# Patient Record
Sex: MALE | Race: WHITE | NOT HISPANIC OR LATINO | Employment: FULL TIME | URBAN - METROPOLITAN AREA
[De-identification: names, ages, dates, MRNs, and addresses within clinical notes are randomized per-mention and may not be internally consistent; named-entity substitution may affect disease eponyms.]

---

## 2019-02-16 ENCOUNTER — OFFICE VISIT (OUTPATIENT)
Dept: URGENT CARE | Facility: CLINIC | Age: 39
End: 2019-02-16
Payer: COMMERCIAL

## 2019-02-16 VITALS
TEMPERATURE: 102 F | WEIGHT: 245 LBS | DIASTOLIC BLOOD PRESSURE: 65 MMHG | HEART RATE: 107 BPM | OXYGEN SATURATION: 98 % | HEIGHT: 66 IN | RESPIRATION RATE: 18 BRPM | SYSTOLIC BLOOD PRESSURE: 115 MMHG | BODY MASS INDEX: 39.37 KG/M2

## 2019-02-16 DIAGNOSIS — R50.9 FEVER, UNSPECIFIED FEVER CAUSE: ICD-10-CM

## 2019-02-16 DIAGNOSIS — J10.1 INFLUENZA A: Primary | ICD-10-CM

## 2019-02-16 LAB
CTP QC/QA: YES
FLUAV AG NPH QL: POSITIVE
FLUBV AG NPH QL: NEGATIVE

## 2019-02-16 PROCEDURE — 87804 INFLUENZA ASSAY W/OPTIC: CPT | Mod: QW,S$GLB,, | Performed by: NURSE PRACTITIONER

## 2019-02-16 PROCEDURE — 87804 POCT INFLUENZA A/B: ICD-10-PCS | Mod: 59,QW,S$GLB, | Performed by: NURSE PRACTITIONER

## 2019-02-16 PROCEDURE — 99203 OFFICE O/P NEW LOW 30 MIN: CPT | Mod: S$GLB,,, | Performed by: NURSE PRACTITIONER

## 2019-02-16 PROCEDURE — 99203 PR OFFICE/OUTPT VISIT, NEW, LEVL III, 30-44 MIN: ICD-10-PCS | Mod: S$GLB,,, | Performed by: NURSE PRACTITIONER

## 2019-02-16 RX ORDER — OSELTAMIVIR PHOSPHATE 75 MG/1
75 CAPSULE ORAL 2 TIMES DAILY
Qty: 10 CAPSULE | Refills: 0 | Status: SHIPPED | OUTPATIENT
Start: 2019-02-16 | End: 2019-02-21

## 2019-02-16 NOTE — PROGRESS NOTES
"Subjective:       Patient ID: Aldo Valencia is a 38 y.o. male.    Vitals:  height is 5' 6" (1.676 m) and weight is 111.1 kg (245 lb). His temperature is 102.4 °F (39.1 °C) (abnormal). His blood pressure is 115/65 and his pulse is 107. His respiration is 18 and oxygen saturation is 98%.     Chief Complaint: URI    URI    This is a new problem. The current episode started today. The problem has been unchanged. The maximum temperature recorded prior to his arrival was 102 - 102.9 F. Associated symptoms include congestion, coughing, headaches, rhinorrhea, sinus pain and a sore throat. Pertinent negatives include no ear pain, nausea, rash, vomiting or wheezing. He has tried antihistamine for the symptoms. The treatment provided no relief.       Constitution: Positive for chills, sweating, fatigue and fever.   HENT: Positive for congestion, postnasal drip, sinus pain, sinus pressure and sore throat. Negative for ear pain and voice change.    Neck: Negative for painful lymph nodes.   Eyes: Negative for eye redness.   Respiratory: Positive for cough and sputum production. Negative for chest tightness, bloody sputum, COPD, shortness of breath, stridor, wheezing and asthma.    Gastrointestinal: Negative for nausea and vomiting.   Musculoskeletal: Positive for muscle ache.   Skin: Negative for rash.   Allergic/Immunologic: Negative for seasonal allergies and asthma.   Neurological: Positive for headaches.   Hematologic/Lymphatic: Negative for swollen lymph nodes.       Objective:      Physical Exam   Constitutional: He is oriented to person, place, and time. He appears well-developed and well-nourished. He is cooperative.  Non-toxic appearance. He does not appear ill. No distress.   HENT:   Head: Normocephalic and atraumatic.   Right Ear: Hearing, external ear and ear canal normal. A middle ear effusion is present.   Left Ear: Hearing, external ear and ear canal normal. A middle ear effusion is present.   Nose: Mucosal edema " and rhinorrhea present. No nasal deformity. No epistaxis. Right sinus exhibits no maxillary sinus tenderness and no frontal sinus tenderness. Left sinus exhibits no maxillary sinus tenderness and no frontal sinus tenderness.   Mouth/Throat: Uvula is midline, oropharynx is clear and moist and mucous membranes are normal. No trismus in the jaw. Normal dentition. No uvula swelling. No oropharyngeal exudate, posterior oropharyngeal edema or posterior oropharyngeal erythema.   Eyes: Conjunctivae and lids are normal. No scleral icterus.   Sclera clear bilat   Neck: Trachea normal, full passive range of motion without pain and phonation normal. Neck supple.   Cardiovascular: Normal rate, regular rhythm, normal heart sounds, intact distal pulses and normal pulses.   Pulmonary/Chest: Effort normal and breath sounds normal. No stridor. No respiratory distress. He has no decreased breath sounds. He has no wheezes.   Abdominal: Soft. Normal appearance and bowel sounds are normal. He exhibits no distension. There is no tenderness.   Musculoskeletal: Normal range of motion. He exhibits no edema or deformity.   Neurological: He is alert and oriented to person, place, and time. He exhibits normal muscle tone. Coordination normal.   Skin: Skin is warm, dry and intact. He is not diaphoretic. No pallor.   Psychiatric: He has a normal mood and affect. His speech is normal and behavior is normal. Judgment and thought content normal. Cognition and memory are normal.   Nursing note and vitals reviewed.      Results for orders placed or performed in visit on 02/16/19   POCT Influenza A/B   Result Value Ref Range    Rapid Influenza A Ag Positive (A) Negative    Rapid Influenza B Ag Negative Negative     Acceptable Yes      Assessment:       1. Influenza A    2. Fever, unspecified fever cause        Plan:         Influenza A  -     oseltamivir (TAMIFLU) 75 MG capsule; Take 1 capsule (75 mg total) by mouth 2 (two) times daily.  for 5 days  Dispense: 10 capsule; Refill: 0    Fever, unspecified fever cause  -     POCT Influenza A/B

## 2019-02-16 NOTE — LETTER
February 16, 2019      [facility] [address], [address] [address]  Phone: [phone]  Fax: [phone]       Patient: Aldo Valencia   YOB: 1980  Date of Visit: 02/16/2019    To Whom It May Concern:    Gregory Valencia  was at [facility] on 02/16/2019. He may return to work/school on 2/20/19 with no restrictions pending he has been fever free for 24 hours. If you have any questions or concerns, or if I can be of further assistance, please do not hesitate to contact me.    Sincerely,    Eddie Davis, NP

## 2021-06-08 ENCOUNTER — OFFICE VISIT (OUTPATIENT)
Dept: INTERNAL MEDICINE | Facility: CLINIC | Age: 41
End: 2021-06-08
Payer: COMMERCIAL

## 2021-06-08 VITALS
SYSTOLIC BLOOD PRESSURE: 138 MMHG | HEIGHT: 66 IN | HEART RATE: 97 BPM | WEIGHT: 253.31 LBS | OXYGEN SATURATION: 97 % | BODY MASS INDEX: 40.71 KG/M2 | DIASTOLIC BLOOD PRESSURE: 80 MMHG | TEMPERATURE: 98 F

## 2021-06-08 DIAGNOSIS — J06.9 UPPER RESPIRATORY TRACT INFECTION, UNSPECIFIED TYPE: Primary | ICD-10-CM

## 2021-06-08 DIAGNOSIS — I10 ESSENTIAL HYPERTENSION: ICD-10-CM

## 2021-06-08 PROCEDURE — 99999 PR PBB SHADOW E&M-EST. PATIENT-LVL IV: ICD-10-PCS | Mod: PBBFAC,,, | Performed by: NURSE PRACTITIONER

## 2021-06-08 PROCEDURE — 99203 OFFICE O/P NEW LOW 30 MIN: CPT | Mod: S$GLB,,, | Performed by: NURSE PRACTITIONER

## 2021-06-08 PROCEDURE — 99999 PR PBB SHADOW E&M-EST. PATIENT-LVL IV: CPT | Mod: PBBFAC,,, | Performed by: NURSE PRACTITIONER

## 2021-06-08 PROCEDURE — 99203 PR OFFICE/OUTPT VISIT, NEW, LEVL III, 30-44 MIN: ICD-10-PCS | Mod: S$GLB,,, | Performed by: NURSE PRACTITIONER

## 2021-06-08 RX ORDER — FLUTICASONE PROPIONATE 50 MCG
2 SPRAY, SUSPENSION (ML) NASAL DAILY
Qty: 16 G | Refills: 0 | Status: SHIPPED | OUTPATIENT
Start: 2021-06-08

## 2021-06-08 RX ORDER — METHYLPREDNISOLONE 4 MG/1
TABLET ORAL
Qty: 21 TABLET | Refills: 0 | Status: SHIPPED | OUTPATIENT
Start: 2021-06-08

## 2021-06-08 RX ORDER — HYDROCHLOROTHIAZIDE 12.5 MG/1
12.5 TABLET ORAL DAILY
COMMUNITY
Start: 2021-05-10

## 2021-06-08 RX ORDER — AMLODIPINE AND BENAZEPRIL HYDROCHLORIDE 5; 40 MG/1; MG/1
1 CAPSULE ORAL DAILY
COMMUNITY
Start: 2021-05-10

## 2025-05-03 ENCOUNTER — HOSPITAL ENCOUNTER (EMERGENCY)
Facility: HOSPITAL | Age: 45
Discharge: HOME | End: 2025-05-03
Attending: EMERGENCY MEDICINE
Payer: COMMERCIAL

## 2025-05-03 VITALS
HEIGHT: 66 IN | HEART RATE: 70 BPM | OXYGEN SATURATION: 99 % | WEIGHT: 190 LBS | BODY MASS INDEX: 30.53 KG/M2 | TEMPERATURE: 98 F | SYSTOLIC BLOOD PRESSURE: 148 MMHG | RESPIRATION RATE: 20 BRPM | DIASTOLIC BLOOD PRESSURE: 83 MMHG

## 2025-05-03 DIAGNOSIS — R07.9 CHEST PAIN: ICD-10-CM

## 2025-05-03 LAB
ALBUMIN SERPL-MCNC: 4.7 G/DL (ref 3.3–5.5)
ALP SERPL-CCNC: 54 U/L (ref 42–141)
AMPHET UR QL SCN: NEGATIVE
BARBITURATE SCN PRESENT UR: NEGATIVE
BENZODIAZ UR QL SCN: NEGATIVE
BILIRUB SERPL-MCNC: 0.7 MG/DL (ref 0.2–1.6)
BILIRUBIN, POC UA: NEGATIVE
BLOOD, POC UA: NEGATIVE
BUN SERPL-MCNC: 28 MG/DL (ref 7–22)
CALCIUM SERPL-MCNC: 10 MG/DL (ref 8–10.3)
CANNABINOIDS UR QL SCN: NEGATIVE
CHLORIDE SERPL-SCNC: 101 MMOL/L (ref 98–108)
CLARITY, UA: CLEAR
COCAINE UR QL SCN: NEGATIVE
COLOR, UA: YELLOW
CREAT SERPL-MCNC: 1.3 MG/DL (ref 0.6–1.2)
CREAT UR-MCNC: 83.5 MG/DL (ref 23–375)
GLUCOSE SERPL-MCNC: 111 MG/DL (ref 73–118)
GLUCOSE, POC UA: NEGATIVE
HCT, POC: NORMAL
HGB, POC: NORMAL (ref 14–18)
KETONES, POC UA: NEGATIVE
LEUKOCYTE EST, POC UA: NEGATIVE
MCH, POC: NORMAL
MCHC, POC: NORMAL
MCV, POC: NORMAL
METHADONE UR QL SCN: NEGATIVE
MPV, POC: NORMAL
NITRITE, POC UA: NEGATIVE
OPIATES UR QL SCN: NEGATIVE
PCP UR QL: NEGATIVE
PH UR STRIP: 5.5 [PH] (ref 5–8)
POC ALT (SGPT): 43 U/L (ref 10–47)
POC AST (SGOT): 54 U/L (ref 11–38)
POC B-TYPE NATRIURETIC PEPTIDE: 15.7 PG/ML (ref 0–100)
POC CARDIAC TROPONIN I: 0 NG/ML (ref 0–0.08)
POC CARDIAC TROPONIN I: 0 NG/ML (ref 0–0.08)
POC PLATELET COUNT: NORMAL
POC PTINR: 1.1 (ref 0.9–1.2)
POC PTWBT: 11.1 SEC (ref 9.7–14.3)
POC TCO2: 28 MMOL/L (ref 18–33)
POTASSIUM BLD-SCNC: 4.8 MMOL/L (ref 3.6–5.1)
PROTEIN, POC UA: NEGATIVE
PROTEIN, POC: 7.7 G/DL (ref 6.4–8.1)
RBC, POC: NORMAL
RDW, POC: NORMAL
SAMPLE: NORMAL
SODIUM BLD-SCNC: 139 MMOL/L (ref 128–145)
SPECIFIC GRAVITY, POC UA: 1.02 (ref 1–1.03)
UROBILINOGEN, POC UA: 0.2 E.U./DL
WBC, POC: NORMAL

## 2025-05-03 PROCEDURE — 84484 ASSAY OF TROPONIN QUANT: CPT | Mod: ER

## 2025-05-03 PROCEDURE — 25500020 PHARM REV CODE 255: Mod: ER | Performed by: EMERGENCY MEDICINE

## 2025-05-03 PROCEDURE — 83880 ASSAY OF NATRIURETIC PEPTIDE: CPT | Mod: ER

## 2025-05-03 PROCEDURE — 93005 ELECTROCARDIOGRAM TRACING: CPT | Mod: ER

## 2025-05-03 PROCEDURE — 25000003 PHARM REV CODE 250: Mod: ER | Performed by: EMERGENCY MEDICINE

## 2025-05-03 PROCEDURE — 80307 DRUG TEST PRSMV CHEM ANLYZR: CPT | Performed by: EMERGENCY MEDICINE

## 2025-05-03 PROCEDURE — 85610 PROTHROMBIN TIME: CPT | Mod: ER

## 2025-05-03 PROCEDURE — 96360 HYDRATION IV INFUSION INIT: CPT | Mod: ER

## 2025-05-03 PROCEDURE — 93010 ELECTROCARDIOGRAM REPORT: CPT | Mod: 76,,, | Performed by: INTERNAL MEDICINE

## 2025-05-03 PROCEDURE — 99285 EMERGENCY DEPT VISIT HI MDM: CPT | Mod: 25,ER

## 2025-05-03 PROCEDURE — 80053 COMPREHEN METABOLIC PANEL: CPT | Mod: ER

## 2025-05-03 PROCEDURE — 85025 COMPLETE CBC W/AUTO DIFF WBC: CPT | Mod: ER

## 2025-05-03 PROCEDURE — 93010 ELECTROCARDIOGRAM REPORT: CPT | Mod: ,,, | Performed by: INTERNAL MEDICINE

## 2025-05-03 PROCEDURE — 96361 HYDRATE IV INFUSION ADD-ON: CPT | Mod: ER

## 2025-05-03 PROCEDURE — 81003 URINALYSIS AUTO W/O SCOPE: CPT | Mod: 59,ER

## 2025-05-03 RX ORDER — ASPIRIN 325 MG
325 TABLET ORAL
Status: COMPLETED | OUTPATIENT
Start: 2025-05-03 | End: 2025-05-03

## 2025-05-03 RX ORDER — NITROGLYCERIN 20 MG/G
1 OINTMENT TOPICAL ONCE
Status: DISCONTINUED | OUTPATIENT
Start: 2025-05-03 | End: 2025-05-03

## 2025-05-03 RX ORDER — HYDRALAZINE HYDROCHLORIDE 20 MG/ML
10 INJECTION INTRAMUSCULAR; INTRAVENOUS DAILY PRN
Status: DISCONTINUED | OUTPATIENT
Start: 2025-05-03 | End: 2025-05-03 | Stop reason: HOSPADM

## 2025-05-03 RX ADMIN — ASPIRIN 325 MG ORAL TABLET 325 MG: 325 PILL ORAL at 03:05

## 2025-05-03 RX ADMIN — SODIUM CHLORIDE 1000 ML: 9 INJECTION, SOLUTION INTRAVENOUS at 04:05

## 2025-05-03 RX ADMIN — IOHEXOL 100 ML: 350 INJECTION, SOLUTION INTRAVENOUS at 04:05

## 2025-05-03 NOTE — ED PROVIDER NOTES
Encounter Date: 5/3/2025    SCRIBE #1 NOTE: I, Lindsey Askew, am scribing for, and in the presence of,  Samira Yeboah DO. I have scribed the following portions of the note - the EKG reading. Other sections scribed: HPI, ROS, PE.       History     Chief Complaint   Patient presents with    Chest Pain     Pt presents w/ a c/o an chest pain for approximately 12 this afternoon. Reports sob for a week as well. No cardiac hx. No pain at this time.     Aldo Valencia is a 45 y.o. male with Hx of HTN and hypogonadism who presents to the ED for chief complaint of non-radiating left-sided chest pain that began at 11 am today during lunch. Patient describes his chest pain as chest tightness that was rated 4.5/10 in severity, lasted 45 minutes, and resolved after taking 2 doses of ASA which he is unable to recall the dosage of. He denies any current chest pain while laying down but states he has chest pain when he tries to sit up. Patient reports he has been having SOB x couple weeks and reports concerns of withdrawal d/t his SOB beginning after he stopped taking 7-Star 7-hydroxymitragynine dietary supplements 30 mg x few weeks. He reports he bought it from the [employer] and it would give him an euphoric sensation. He reports he does not have any SOB when working out in the gym every morning. He reports he lost 100 pounds after going to the gym, fasting, and dieting for 2 years. He reports taking testosterone for hypogonadism. No daily medications. No other exacerbating or alleviating factors. Denies smoking cigarettes.     The history is provided by the patient. No  was used.     Review of patient's allergies indicates:  No Known Allergies  Past Medical History:   Diagnosis Date    Hypertension      Past Surgical History:   Procedure Laterality Date    VASECTOMY       Family History   Problem Relation Name Age of Onset    Hypertension Maternal Grandmother      Hyperlipidemia Maternal Grandmother      Diabetes  Maternal Grandmother      Prostate cancer Paternal Grandfather      Lung cancer Paternal Grandfather       Social History[1]  Review of Systems   Constitutional:  Negative for fever.   HENT:  Negative for rhinorrhea and sore throat.    Respiratory:  Positive for chest tightness (none currently) and shortness of breath.    Cardiovascular:  Positive for chest pain (none currently). Negative for leg swelling.   Skin:  Negative for rash.   Neurological:  Negative for numbness.   All other systems reviewed and are negative.      Physical Exam     Initial Vitals [05/03/25 1515]   BP Pulse Resp Temp SpO2   (!) 174/78 77 20 98 °F (36.7 °C) 99 %      MAP       --         Physical Exam    Nursing note and vitals reviewed.  Constitutional: He appears well-developed and well-nourished.   HENT:   Head: Normocephalic and atraumatic.   Right Ear: External ear normal.   Left Ear: External ear normal.   Nose: Nose normal. Mouth/Throat: Oropharynx is clear and moist.   Eyes: Conjunctivae and EOM are normal. Pupils are equal, round, and reactive to light.   Neck: Neck supple. JVD (bilateral) present.   Normal range of motion.  Cardiovascular:  Normal rate, regular rhythm and normal heart sounds.     Exam reveals no gallop and no friction rub.       No murmur heard.  Pulmonary/Chest: Breath sounds normal. No respiratory distress. He has no wheezes. He has no rhonchi. He has no rales. He exhibits no tenderness.   Abdominal: Abdomen is soft. Bowel sounds are normal. There is no abdominal tenderness. There is no rebound and no guarding.   Musculoskeletal:         General: No tenderness or edema. Normal range of motion.      Cervical back: Normal range of motion and neck supple.     Neurological: He is alert and oriented to person, place, and time. No cranial nerve deficit.   Skin: Skin is warm and dry. Capillary refill takes less than 2 seconds. No rash noted.   Psychiatric: He has a normal mood and affect. His behavior is normal.          ED Course   Critical Care    Date/Time: 5/4/2025 6:47 AM    Performed by: Samira Yeboah DO  Authorized by: Samira Yeboah DO  Direct patient critical care time: 8 minutes  Additional history critical care time: 8 minutes  Ordering / reviewing critical care time: 8 minutes  Documentation critical care time: 8 minutes  Total critical care time (exclusive of procedural time) : 32 minutes  Critical care was necessary to treat or prevent imminent or life-threatening deterioration of the following conditions: cardiac failure.  Critical care was time spent personally by me on the following activities: evaluation of patient's response to treatment, examination of patient, obtaining history from patient or surrogate, ordering and performing treatments and interventions, ordering and review of laboratory studies, ordering and review of radiographic studies, pulse oximetry, re-evaluation of patient's condition and review of old charts.        Labs Reviewed   POCT CMP - Abnormal       Result Value    Albumin, POC 4.7      Alkaline Phosphatase, POC 54      ALT (SGPT), POC 43      AST (SGOT), POC 54 (*)     POC BUN 28 (*)     Calcium, POC 10.0      POC Chloride 101      POC Creatinine 1.3 (*)     POC Glucose 111      POC Potassium 4.8      POC Sodium 139      Bilirubin, POC 0.7      POC TCO2 28      Protein, POC 7.7     DRUG SCREEN PANEL, URINE EMERGENCY - Normal    Benzodiazepine, Urine Negative      Methadone, Urine Negative      Cocaine, Urine Negative      Opiates, Urine Negative      Barbiturates, Urine Negative      Amphetamines, Urine Negative      THC Negative      Phencyclidine, Urine Negative      Urine Creatinine 83.5      Narrative:     This screen includes the following classes of drugs at the listed cut-off:     Benzodiazepines:        200 ng/ml   Methadone:              300 ng/ml   Cocaine metabolite:     300 ng/ml   Opiates:                300 ng/ml   Barbiturates:           200 ng/ml   Amphetamines:         "   1000 ng/ml   Marijuana metabs (THC): 50 ng/ml   Phencyclidine (PCP):    25 ng/ml     This is a screening test. If results do not correlate with clinical presentation, then a confirmatory send out test is advised.    This report is intended for use in clinical monitoring and management of patients. It is not intended for use in employment related drug testing."   TROPONIN ISTAT    POC Cardiac Troponin I 0.00      Sample unknown     TROPONIN ISTAT    POC Cardiac Troponin I 0.00      Sample unknown     POCT CBC    Hematocrit        Hemoglobin        RBC        WBC        MCV        MCH, POC        MCHC        RDW-CV        Platelet Count, POC        MPV       POCT CMP   POCT PROTIME-INR   POCT TROPONIN   POCT B-TYPE NATRIURETIC PEPTIDE (BNP)   POCT URINALYSIS W/O SCOPE   ISTAT PROCEDURE    POC PTWBT 11.1      POC PTINR 1.1      Sample unknown     POCT B-TYPE NATRIURETIC PEPTIDE (BNP)    POC B-Type Natriuretic Peptide 15.7     POCT URINALYSIS W/O SCOPE    Glucose, UA Negative      Bilirubin, UA Negative      Ketones, UA Negative      Spec Grav UA 1.020      Blood, UA Negative      PH, UA 5.5      Protein, UA Negative      Urobilinogen, UA 0.2      Nitrite, UA Negative      Leukocytes, UA Negative      Color, UA POC Yellow      Clarity, UA, POC Clear     POCT TROPONIN     EKG Readings: (Independently Interpreted)   No STEMI. Rate of 98. Normal Sinus Rhythm. Normal Axis. Abnormal EKG. QTc abnormal at 451. No prior EKG available for comparison.        Imaging Results              CTA Chest Non-Coronary (PE Studies) (Final result)  Result time 05/03/25 16:48:24      Final result by Geovany Lancaster DO (05/03/25 16:48:24)                   Impression:      1. No evidence to suggest pulmonary embolism.    2.  No acute pathology noted within the chest.    3.  Incidental note made of a tiny 2-3 mm noncalcified pulmonary nodule.  Per Fleischner Society guidelines for nodule <6mm; in a low risk patient, no follow-up " recommended.  In a high risk patient/smoker, consider 12 month CT chest follow-up to exclude neoplasia. If stable at that time, no further follow-up needed.      Electronically signed by: Geovany Lancaster DO  Date:    05/03/2025  Time:    16:48               Narrative:    EXAMINATION:  CTA CHEST NON CORONARY (PE STUDIES)    CLINICAL HISTORY:  Pulmonary embolism (PE) suspected, unknown D-dimer;    TECHNIQUE:  CT of the chest was acquired helically utilizing a low-dose technique from the lung apices through the posterior costophrenic angles status post administration of 100 cc of Omnipaque 350.  Bolus timing was utilized to optimize opacification of the pulmonary arterial system. 3-D maximum intensity projection and multiplanar reconstructions were created from the source data set and interpreted.    COMPARISON:  None    FINDINGS:  No infiltrates or pleural effusions are identified.  Tiny 2-3 mm noncalcified pulmonary nodule seen within the right midlung zone series 3, image 126.    The aorta demonstrates normal caliber and contour. There is good opacification of the pulmonary arterial system. No intraluminal filling defects are notified within the pulmonary arterial system to suggest pulmonary embolism. There is no pericardial effusion.  No enlarged mediastinal, hilar or axillary lymph nodes are identified.    The visualized upper abdomen is unremarkable in appearance.    The osseous structures are unremarkable in appearance.                                       X-Ray Chest PA And Lateral (Final result)  Result time 05/03/25 15:53:09      Final result by Jad Prajapati MD (05/03/25 15:53:09)                   Impression:      No acute process.      Electronically signed by: Jad Prajapati MD  Date:    05/03/2025  Time:    15:53               Narrative:    EXAMINATION:  XR CHEST PA AND LATERAL    CLINICAL HISTORY:  Chest Pain;    TECHNIQUE:  PA and lateral views of the chest were  performed.    COMPARISON:  None    FINDINGS:  Monitoring EKG leads are present.  The trachea is unremarkable.  The cardiomediastinal silhouette is within normal limits.  The hilar structures are unremarkable.  There are no pleural effusions.  There is no evidence of a pneumothorax.  There is no evidence of pneumomediastinum.  No airspace opacity is present.    There is no evidence of free air beneath the hemidiaphragms.  There are degenerative changes in the osseous structures.                                       Medications   aspirin tablet 325 mg (325 mg Oral Given 5/3/25 1557)   sodium chloride 0.9% bolus 1,000 mL 1,000 mL (0 mLs Intravenous Stopped 5/3/25 1757)   iohexoL (OMNIPAQUE 350) injection 100 mL (100 mLs Intravenous Given 5/3/25 1636)     Medical Decision Making  Amount and/or Complexity of Data Reviewed  Labs: ordered. Decision-making details documented in ED Course.  Radiology: ordered. Decision-making details documented in ED Course.  ECG/medicine tests: ordered and independent interpretation performed. Decision-making details documented in ED Course.    Risk  OTC drugs.  Prescription drug management.    .Medical Decision Making:    This is an evaluation of a 45 y.o. male that presents to the Emergency Department for left-sided chest pain and SOB.   Chief Complaint   Patient presents with    Chest Pain     Pt presents w/ a c/o an chest pain for approximately 12 this afternoon. Reports sob for a week as well. No cardiac hx. No pain at this time.       Independent historian: none    The patient is a non-toxic and well appearing patient. On physical exam, patient appears well hydrated with moist mucus membranes. Breath sounds are clear and equal bilaterally with no adventitious breath sounds, tachypnea or respiratory distress. Regular rate and rhythm. No murmurs. Abdomen soft and non tender. Patient is tolerating PO without difficulty. Physical exam otherwise as above.     I have reviewed vital signs  and nursing notes.   Vital Signs Are Reassuring.     Based on the patient's symptoms, I am considering and evaluating for the following differential diagnoses: STEMI, NSTEMI, cardiac arrhythmia, pneumonia, withdrawal.     Consider hospitalization for:  Chest pain    Patient is agreeable to transfer and admission to [facility] if necessary    ED Course:Treatment in the ED included Physical Exam and medications given in ED  Medications   aspirin tablet 325 mg (325 mg Oral Given 5/3/25 1557)   sodium chloride 0.9% bolus 1,000 mL 1,000 mL (0 mLs Intravenous Stopped 5/3/25 1757)   iohexoL (OMNIPAQUE 350) injection 100 mL (100 mLs Intravenous Given 5/3/25 1636)   .  Patient tolerating p.o. without difficulty.  Patient reports feeling better after treatment in the ER.       External Data/Documents Reviewed: Previous medical records and vital signs reviewed, see HPI and Physical exam.   Labs: ordered and reviewed.  Troponin within normal limits at 0.00.  Radiology: ordered as indicated and reviewed.  No pneumothorax  ECG/medicine tests: ordered and independent interpretation performed by Dr. Samira Yeboah DO. Decision-making details documented in ED Course.   Cardiac monitor placed for chest pain. Monitor shows Normal Sinus Rhythm with  rate of 68. Interpreted by Dr. Samira Yeboah DO.    Risk  Diagnosis or treatment significantly limited by the following social determinants of health: Body mass index is 30.67 kg/m².     In shared decision making with the patient, we discussed treatment, prescriptions, labs, and imaging results.    Discussed with patient need for follow up with Cardiology.  Recommended 1-2 day follow up with Cardiology for further evaluation of chest pain.  We also discussed taking a daily aspirin.  Patient states he has aspirin at home we will continue to take it.    Fill and take prescriptions as directed.  Return to the ED if symptoms worsen or do not resolve.   Answered questions and  discussed discharge plan.    Patient reports resolution  of symptoms and is ready for discharge.  Follow up with PCP/specialist in 1 day       At time of discharge patient is awake alert oriented x4 speaking clearly in full sentences and moving all 4 extremities.     The following labs and imaging were reviewed:        Admission on 05/03/2025, Discharged on 05/03/2025   Component Date Value Ref Range Status    Benzodiazepine, Urine 05/03/2025 Negative  Negative Final    Methadone, Urine 05/03/2025 Negative  Negative Final    Cocaine, Urine 05/03/2025 Negative  Negative Final    Opiates, Urine 05/03/2025 Negative  Negative Final    Barbiturates, Urine 05/03/2025 Negative  Negative Final    Amphetamines, Urine 05/03/2025 Negative  Negative Final    THC 05/03/2025 Negative  Negative Final    Phencyclidine, Urine 05/03/2025 Negative  Negative Final    Urine Creatinine 05/03/2025 83.5  23.0 - 375.0 mg/dL Final    POC PTWBT 05/03/2025 11.1  9.7 - 14.3 sec Final    POC PTINR 05/03/2025 1.1  0.9 - 1.2 Final    Sample 05/03/2025 unknown   Final    Albumin, POC 05/03/2025 4.7  3.3 - 5.5 g/dL Final    Alkaline Phosphatase, POC 05/03/2025 54  42 - 141 U/L Final    ALT (SGPT), POC 05/03/2025 43  10 - 47 U/L Final    AST (SGOT), POC 05/03/2025 54 (H)  11 - 38 U/L Final    POC BUN 05/03/2025 28 (H)  7 - 22 mg/dL Final    Calcium, POC 05/03/2025 10.0  8.0 - 10.3 mg/dL Final    POC Chloride 05/03/2025 101  98 - 108 mmol/L Final    POC Creatinine 05/03/2025 1.3 (H)  0.6 - 1.2 mg/dL Final    POC Glucose 05/03/2025 111  73 - 118 mg/dL Final    POC Potassium 05/03/2025 4.8  3.6 - 5.1 mmol/L Final    POC Sodium 05/03/2025 139  128 - 145 mmol/L Final    Bilirubin, POC 05/03/2025 0.7  0.2 - 1.6 mg/dL Final    POC TCO2 05/03/2025 28  18 - 33 mmol/L Final    Protein, POC 05/03/2025 7.7  6.4 - 8.1 g/dL Final    POC Cardiac Troponin I 05/03/2025 0.00  0.00 - 0.08 ng/mL Final    Sample 05/03/2025 unknown   Final    Comment: A single negative  troponin is insufficient to rule out myocardial infarction.  The use of a serial sampling protocol is recommended practice. Correlate results with reference intervals established for methodology used. Point of care and core laboratory   troponin results are not interchangeable.      POC B-Type Natriuretic Peptide 05/03/2025 15.7  0.0 - 100.0 pg/mL Final    Glucose, UA 05/03/2025 Negative  Negative Final    Bilirubin, UA 05/03/2025 Negative  Negative Final    Ketones, UA 05/03/2025 Negative  Negative Final    Spec Grav UA 05/03/2025 1.020  1.005 - 1.030 Final    Blood, UA 05/03/2025 Negative  Negative Final    PH, UA 05/03/2025 5.5  5.0 - 8.0 Final    Protein, UA 05/03/2025 Negative  Negative Final    Urobilinogen, UA 05/03/2025 0.2  <=1.0 E.U./dL Final    Nitrite, UA 05/03/2025 Negative  Negative Final    Leukocytes, UA 05/03/2025 Negative  Negative Final    Color, UA POC 05/03/2025 Yellow  Yellow, Straw, Susan Final    Clarity, UA, POC 05/03/2025 Clear  Clear Final    POC Cardiac Troponin I 05/03/2025 0.00  0.00 - 0.08 ng/mL Final    Sample 05/03/2025 unknown   Final    Comment: A single negative troponin is insufficient to rule out myocardial infarction.  The use of a serial sampling protocol is recommended practice. Correlate results with reference intervals established for methodology used. Point of care and core laboratory   troponin results are not interchangeable.          Imaging Results              CTA Chest Non-Coronary (PE Studies) (Final result)  Result time 05/03/25 16:48:24      Final result by Geovany Lancaster DO (05/03/25 16:48:24)                   Impression:      1. No evidence to suggest pulmonary embolism.    2.  No acute pathology noted within the chest.    3.  Incidental note made of a tiny 2-3 mm noncalcified pulmonary nodule.  Per Fleischner Society guidelines for nodule <6mm; in a low risk patient, no follow-up recommended.  In a high risk patient/smoker, consider 12 month CT chest  follow-up to exclude neoplasia. If stable at that time, no further follow-up needed.      Electronically signed by: Geovany Lancaster DO  Date:    05/03/2025  Time:    16:48               Narrative:    EXAMINATION:  CTA CHEST NON CORONARY (PE STUDIES)    CLINICAL HISTORY:  Pulmonary embolism (PE) suspected, unknown D-dimer;    TECHNIQUE:  CT of the chest was acquired helically utilizing a low-dose technique from the lung apices through the posterior costophrenic angles status post administration of 100 cc of Omnipaque 350.  Bolus timing was utilized to optimize opacification of the pulmonary arterial system. 3-D maximum intensity projection and multiplanar reconstructions were created from the source data set and interpreted.    COMPARISON:  None    FINDINGS:  No infiltrates or pleural effusions are identified.  Tiny 2-3 mm noncalcified pulmonary nodule seen within the right midlung zone series 3, image 126.    The aorta demonstrates normal caliber and contour. There is good opacification of the pulmonary arterial system. No intraluminal filling defects are notified within the pulmonary arterial system to suggest pulmonary embolism. There is no pericardial effusion.  No enlarged mediastinal, hilar or axillary lymph nodes are identified.    The visualized upper abdomen is unremarkable in appearance.    The osseous structures are unremarkable in appearance.                                       X-Ray Chest PA And Lateral (Final result)  Result time 05/03/25 15:53:09      Final result by Jad Prajapati MD (05/03/25 15:53:09)                   Impression:      No acute process.      Electronically signed by: Jad Prajapati MD  Date:    05/03/2025  Time:    15:53               Narrative:    EXAMINATION:  XR CHEST PA AND LATERAL    CLINICAL HISTORY:  Chest Pain;    TECHNIQUE:  PA and lateral views of the chest were performed.    COMPARISON:  None    FINDINGS:  Monitoring EKG leads are present.  The trachea is unremarkable.  The  cardiomediastinal silhouette is within normal limits.  The hilar structures are unremarkable.  There are no pleural effusions.  There is no evidence of a pneumothorax.  There is no evidence of pneumomediastinum.  No airspace opacity is present.    There is no evidence of free air beneath the hemidiaphragms.  There are degenerative changes in the osseous structures.                                      DISCLAIMER: This note was prepared with [employer] voice recognition transcription software. Garbled syntax, mangled pronouns, and other bizarre constructions may be attributed to that software system.          Scribe Attestation:   Scribe #1: I performed the above scribed service and the documentation accurately describes the services I performed. I attest to the accuracy of the note.        ED Course as of 05/04/25 0647   Sat May 03, 2025   1759 Repeat EKG obtained at 1751 no STEMI.  Normal sinus rhythm, ventricular rate of 65.  Normal axis.  Normal EKG.  QTC within normal limits at 409.  When compared to earlier EKG rate has decreased by 33 beats per minute [RF]      ED Course User Index  [RF] Samira Yeboah DO                          I, Dr. Samira Yeboah, personally performed the services described in this documentation. This document was produced by a scribe under my direction and in my presence. All medical record entries made by the scribe were at my direction and in my presence.  I have reviewed the chart and agree that the record reflects my personal performance and is accurate and complete. Samira Yeboah DO.     05/04/2025 6:46 AM    DISCLAIMER: This note was prepared with [employer] voice recognition transcription software. Garbled syntax, mangled pronouns, and other bizarre constructions may be attributed to that software system.     Clinical Impression:  Final diagnoses:  [R07.9] Chest pain          ED Disposition Condition    Discharge Stable          ED Prescriptions    None       Follow-up Information        Follow up With Specialties Details Why Contact Info    [facility] Emergency Medicine Go to  If symptoms worsen [id number] [facility] [place] [id number] [phone]    Your PCP  Schedule an appointment as soon as possible for a visit                    [1]   Social History  Tobacco Use    Smoking status: Never    Smokeless tobacco: Never   Substance Use Topics    Alcohol use: Yes    Drug use: No        Samira Yeboah DO  05/04/25 0648

## 2025-05-03 NOTE — DISCHARGE INSTRUCTIONS
CT shows: Impression:     1. No evidence to suggest pulmonary embolism.     2.  No acute pathology noted within the chest.     3.  Incidental note made of a tiny 2-3 mm noncalcified pulmonary nodule.  Per Fleischner Society guidelines for nodule <6mm; in a low risk patient, no follow-up recommended.  In a high risk patient/smoker, consider 12 month CT chest follow-up to exclude neoplasia. If stable at that time, no further follow-up needed.

## 2025-05-03 NOTE — ED NOTES
Pt reports feeling much better. Denies chest pain and denies SOB at this time. VSS resp even and unlabored. Reports ready to go home.

## 2025-05-04 LAB
OHS QRS DURATION: 100 MS
OHS QRS DURATION: 98 MS
OHS QTC CALCULATION: 409 MS
OHS QTC CALCULATION: 451 MS

## 2025-06-06 ENCOUNTER — HOSPITAL ENCOUNTER (EMERGENCY)
Facility: HOSPITAL | Age: 45
Discharge: HOME | End: 2025-06-06
Attending: EMERGENCY MEDICINE
Payer: COMMERCIAL

## 2025-06-06 VITALS
WEIGHT: 189 LBS | HEIGHT: 66 IN | HEART RATE: 69 BPM | SYSTOLIC BLOOD PRESSURE: 154 MMHG | BODY MASS INDEX: 30.37 KG/M2 | TEMPERATURE: 98 F | DIASTOLIC BLOOD PRESSURE: 80 MMHG | RESPIRATION RATE: 20 BRPM | OXYGEN SATURATION: 97 %

## 2025-06-06 DIAGNOSIS — R06.02 SOB (SHORTNESS OF BREATH): Primary | ICD-10-CM

## 2025-06-06 DIAGNOSIS — R06.09 DOE (DYSPNEA ON EXERTION): ICD-10-CM

## 2025-06-06 LAB
ALBUMIN SERPL-MCNC: 4.4 G/DL (ref 3.3–5.5)
ALP SERPL-CCNC: 32 U/L (ref 42–141)
BILIRUB SERPL-MCNC: 0.7 MG/DL (ref 0.2–1.6)
BUN SERPL-MCNC: 24 MG/DL (ref 7–22)
CALCIUM SERPL-MCNC: 9.3 MG/DL (ref 8–10.3)
CHLORIDE SERPL-SCNC: 107 MMOL/L (ref 98–108)
CREAT SERPL-MCNC: 1.2 MG/DL (ref 0.6–1.2)
GLUCOSE SERPL-MCNC: 106 MG/DL (ref 73–118)
HCT, POC: NORMAL
HGB, POC: NORMAL (ref 14–18)
MCH, POC: NORMAL
MCHC, POC: NORMAL
MCV, POC: NORMAL
MPV, POC: NORMAL
OHS QRS DURATION: 100 MS
OHS QTC CALCULATION: 435 MS
POC ALT (SGPT): 38 U/L (ref 10–47)
POC AST (SGOT): 52 U/L (ref 11–38)
POC B-TYPE NATRIURETIC PEPTIDE: 8.4 PG/ML (ref 0–100)
POC CARDIAC TROPONIN I: 0.01 NG/ML (ref 0–0.08)
POC PLATELET COUNT: NORMAL
POC TCO2: 28 MMOL/L (ref 18–33)
POTASSIUM BLD-SCNC: 5 MMOL/L (ref 3.6–5.1)
PROTEIN, POC: 7.1 G/DL (ref 6.4–8.1)
RBC, POC: NORMAL
RDW, POC: NORMAL
SAMPLE: NORMAL
SODIUM BLD-SCNC: 140 MMOL/L (ref 128–145)
WBC, POC: NORMAL

## 2025-06-06 PROCEDURE — 99284 EMERGENCY DEPT VISIT MOD MDM: CPT | Mod: 25,ER

## 2025-06-06 PROCEDURE — 83880 ASSAY OF NATRIURETIC PEPTIDE: CPT | Mod: ER

## 2025-06-06 PROCEDURE — 93005 ELECTROCARDIOGRAM TRACING: CPT | Mod: ER

## 2025-06-06 PROCEDURE — 80053 COMPREHEN METABOLIC PANEL: CPT | Mod: ER

## 2025-06-06 PROCEDURE — 93010 ELECTROCARDIOGRAM REPORT: CPT | Mod: ,,, | Performed by: INTERNAL MEDICINE

## 2025-06-06 PROCEDURE — 84484 ASSAY OF TROPONIN QUANT: CPT | Mod: ER

## 2025-06-06 PROCEDURE — 85025 COMPLETE CBC W/AUTO DIFF WBC: CPT | Mod: ER

## 2025-06-06 NOTE — Clinical Note
"Aldo Mendes" Annie was seen and treated in our emergency department on 6/6/2025.  He may return to work on 06/09/2025.       If you have any questions or concerns, please don't hesitate to call.      Ankita Delgado MD"

## 2025-06-06 NOTE — ED PROVIDER NOTES
Encounter Date: 6/6/2025    SCRIBE #1 NOTE: I, Lindsey Askew, am scribing for, and in the presence of,  Ankita Delgado MD. I have scribed the following portions of the note - Other sections scribed: HPI, ROS, PE.       History     Chief Complaint   Patient presents with    Shortness of Breath     Pt presents w/ a c/o of intermittent sob since his last ER encounter. Report worsening when sitting down. Denies any cp. Denies any resp hx.      Aldo Valencia is a 45 y.o. male, with a PMHx of HTN, who presents to the ED with intermittent SOB since his last ED visit on 5/3/2025. Patient reports during that visit, he was evaluated for chest pain and SOB, which he attributes to taking too much Cialis, which he discontinued with resolution of his chest pain. He reports he has not followed up with cardiology yet. He reports he has SOB when sitting down or exertion, such as walking, that improves after he straightens his body. He reports he feels like he can't get an extra breath and is gasping for air when he sits down. He reports it is not accompanied with dizziness, lightheadedness, abdominal pain, or diaphoresis. He reports his SOB is increasing in frequency, he is having multiple episodes a day, and he last had SOB while in the car coming here. He reports concerns of anxiety. He states he works out 1-2 times a day and he drank a Gorilla Mind energy drink with no more than 200 mg caffeine today. He reports he doesn't drink energy drinks all the time since routinely working out but he used to. He reports he only takes supplements daily including methylene blue, which is new (started just prior to his symptoms initially starting), turkey tail for joints, and creatine. No other exacerbating or alleviating factors. Denies history of respiratory problems. Patient denies chest pain or other associated symptoms.     Per chart review, during his ED visit on 5/3/2025, he had unremarkable EKG, negative troponin, unremarkable CXR, and  CTA chest notable for incidental note made of a tiny 2-3 mm noncalcified pulmonary nodule.     The history is provided by the patient and medical records. No  was used.     Review of patient's allergies indicates:  No Known Allergies  Past Medical History:   Diagnosis Date    Hypertension      Past Surgical History:   Procedure Laterality Date    VASECTOMY       Family History   Problem Relation Name Age of Onset    Hypertension Maternal Grandmother      Hyperlipidemia Maternal Grandmother      Diabetes Maternal Grandmother      Prostate cancer Paternal Grandfather      Lung cancer Paternal Grandfather       Social History[1]  Review of Systems   Constitutional:  Negative for diaphoresis and fever.   Eyes:  Negative for visual disturbance.   Respiratory:  Positive for shortness of breath.    Cardiovascular:  Negative for chest pain.   Gastrointestinal:  Negative for abdominal pain and vomiting.   Skin:  Negative for color change.   Neurological:  Negative for dizziness, light-headedness and headaches.   Psychiatric/Behavioral:  Negative for behavioral problems.        Physical Exam     Initial Vitals [06/06/25 1050]   BP Pulse Resp Temp SpO2   (!) 155/78 77 20 97.8 °F (36.6 °C) 99 %      MAP       --         Physical Exam    Nursing note and vitals reviewed.  Constitutional: He appears well-developed.   HENT:   Head: Normocephalic.   Eyes: Conjunctivae and EOM are normal.   Neck:   Normal range of motion.  Cardiovascular:  Normal rate, regular rhythm and normal heart sounds.           Pulmonary/Chest: Breath sounds normal. No respiratory distress.   Abdominal: He exhibits no distension.   Musculoskeletal:         General: Normal range of motion.      Cervical back: Normal range of motion.     Neurological: He is alert.   Skin: Skin is warm and dry.   Psychiatric: He has a normal mood and affect.         ED Course   Procedures  Labs Reviewed   POCT CMP - Abnormal       Result Value    Albumin, POC  4.4      Alkaline Phosphatase, POC 32 (*)     ALT (SGPT), POC 38      AST (SGOT), POC 52 (*)     POC BUN 24 (*)     Calcium, POC 9.3      POC Chloride 107      POC Creatinine 1.2      POC Glucose 106      POC Potassium 5.0      POC Sodium 140      Bilirubin, POC 0.7      POC TCO2 28      Protein, POC 7.1     TROPONIN ISTAT    POC Cardiac Troponin I 0.01      Sample unknown     POCT CBC    Hematocrit        Hemoglobin        RBC        WBC        MCV        MCH, POC        MCHC        RDW-CV        Platelet Count, POC        MPV       POCT CMP   POCT TROPONIN   POCT B-TYPE NATRIURETIC PEPTIDE (BNP)   POCT B-TYPE NATRIURETIC PEPTIDE (BNP)    POC B-Type Natriuretic Peptide 8.4       EKG Readings: (Independently Interpreted)   Initial Reading: No STEMI. Rhythm: Normal Sinus Rhythm. Heart Rate: 79. Ectopy: No Ectopy.     ECG Results              EKG 12-lead (Final result)        Collection Time Result Time QRS Duration OHS QTC Calculation    06/06/25 11:32:05 06/06/25 14:06:03 100 435                     Final result by Interface, [facility] (06/06/25 14:06:07)                   Narrative:    Test Reason : R06.09,    Vent. Rate :  79 BPM     Atrial Rate :  79 BPM     P-R Int : 160 ms          QRS Dur : 100 ms      QT Int : 380 ms       P-R-T Axes :  73  83  -3 degrees    QTcB Int : 435 ms    Normal sinus rhythm  Nonspecific T wave abnormality  Abnormal ECG  When compared with ECG of 03-May-2025 17:51,  No significant change was found  Confirmed by Cheikh Estrada ([id number]) on 6/6/2025 2:06:00 PM    Referred By:            Confirmed By: Cheikh Estrada                                  Imaging Results              X-Ray Chest PA And Lateral (Final result)  Result time 06/06/25 12:01:27      Final result by Devyn Branham MD (06/06/25 12:01:27)                   Impression:      See above      Electronically signed by: Devyn Branham MD  Date:    06/06/2025  Time:    12:01               Narrative:    EXAMINATION:  XR CHEST PA  AND LATERAL    CLINICAL HISTORY:  Other forms of dyspnea    TECHNIQUE:  PA and lateral views of the chest were performed.    COMPARISON:  No 05/03/2025 ne    FINDINGS:  Heart size normal.  The lungs are clear.  No pleural effusion.  Faintly visualized nodular densities at the lung bases possible the nipple shadows.                                       Medications - No data to display  Medical Decision Making  This is an emergent evaluation of a 45-year-old man who presented to the emergency department today for evaluation of continued shortness of breath.  Differential diagnoses included medication or supplement side effect, ACS, pneumonia, amongst others.  On physical examination, patient was in no acute distress.  Heart and lung sounds were within normal limits.  He was not tachypneic.  EKG was obtained and showed a normal sinus rhythm without STEMI.  Troponin was obtained and was 0.01.  BNP was 8.4.  CMP showed a mild elevation in his AST however this is similar to his previous labs.  CBC showed no acute abnormalities.  Chest x-ray resulted as below.  All findings were discussed with the patient and his wife.  I had a long discussion with him regarding the supplements that reportedly began approximately 30 days ago which preceded the onset of these reported symptoms.  One of his supplements was methylene blue.  I advised him to discontinue this supplement as this may be the etiology of his shortness of breath and symptomatology today.  He additionally stated he has been taking a supplement called turkey tail for his joints.  I advised to discontinue this.  Patient was advised to follow up closely with his primary care physician for re-evaluation and with Cardiology as previously advised.  Return precautions provided all questions and concerns were addressed.    Ankita Delgado MD  4:05 PM  6/6/2025       Amount and/or Complexity of Data Reviewed  External Data Reviewed: radiology, ECG and notes.     Details: See  HPI.  Labs: ordered. Decision-making details documented in ED Course.  Radiology: ordered. Decision-making details documented in ED Course.  ECG/medicine tests: ordered and independent interpretation performed. Decision-making details documented in ED Course.            Scribe Attestation:   Scribe #1: I performed the above scribed service and the documentation accurately describes the services I performed. I attest to the accuracy of the note.                             I, Ankita Delgado , personally performed the services described in this documentation. All medical record entries made by the scribe were at my direction and in my presence. I have reviewed the chart and agree that the record reflects my personal performance and is accurate and complete.    Clinical Impression:  Final diagnoses:  [R06.09] VALLADARES (dyspnea on exertion)  [R06.02] SOB (shortness of breath) (Primary)          ED Disposition Condition    Discharge Stable          ED Prescriptions    None       Follow-up Information       Follow up With Specialties Details Why Contact Info    Ivis Ornelas, NP Family Medicine Schedule an appointment as soon as possible for a visit   [address] [place] [address]  Ozzy NERI [phone]                     [1]   Social History  Tobacco Use    Smoking status: Never    Smokeless tobacco: Never   Substance Use Topics    Alcohol use: Yes    Drug use: No        Ankita Delgado MD  06/06/25 3607

## 2025-06-19 ENCOUNTER — OFFICE VISIT (OUTPATIENT)
Dept: CARDIOLOGY | Facility: CLINIC | Age: 45
End: 2025-06-19
Payer: COMMERCIAL

## 2025-06-19 VITALS
DIASTOLIC BLOOD PRESSURE: 86 MMHG | SYSTOLIC BLOOD PRESSURE: 146 MMHG | BODY MASS INDEX: 31.41 KG/M2 | HEIGHT: 66 IN | WEIGHT: 195.44 LBS | HEART RATE: 66 BPM

## 2025-06-19 DIAGNOSIS — R07.9 CHEST PAIN: ICD-10-CM

## 2025-06-19 DIAGNOSIS — R07.89 CHEST PAIN, ATYPICAL: ICD-10-CM

## 2025-06-19 DIAGNOSIS — I10 ESSENTIAL HYPERTENSION: ICD-10-CM

## 2025-06-19 DIAGNOSIS — R06.09 DOE (DYSPNEA ON EXERTION): Primary | ICD-10-CM

## 2025-06-19 PROCEDURE — 99999 PR PBB SHADOW E&M-EST. PATIENT-LVL IV: CPT | Mod: PBBFAC,,, | Performed by: INTERNAL MEDICINE

## 2025-06-19 PROCEDURE — 99204 OFFICE O/P NEW MOD 45 MIN: CPT | Mod: S$GLB,,, | Performed by: INTERNAL MEDICINE

## 2025-06-19 RX ORDER — AMLODIPINE BESYLATE 5 MG/1
5 TABLET ORAL DAILY
Qty: 90 TABLET | Refills: 3 | Status: SHIPPED | OUTPATIENT
Start: 2025-06-19 | End: 2025-06-19 | Stop reason: SDUPTHER

## 2025-06-19 RX ORDER — FOLIC ACID 1 MG/1
TABLET ORAL DAILY
COMMUNITY

## 2025-06-19 RX ORDER — AMLODIPINE BESYLATE 5 MG/1
5 TABLET ORAL DAILY
Qty: 90 TABLET | Refills: 3 | Status: SHIPPED | OUTPATIENT
Start: 2025-06-19 | End: 2026-06-19

## 2025-06-19 RX ORDER — TADALAFIL 5 MG/1
2.5 TABLET ORAL
COMMUNITY

## 2025-06-19 NOTE — PROGRESS NOTES
Subjective   Patient ID:  Aldo Valencia is a 45 y.o. male who presents for evaluation of Hospital Follow Up      HPI    HTN    Went to the ER 6/6/25  Aldo Valencia is a 45 y.o. male, with a PMHx of HTN, who presents to the ED with intermittent SOB since his last ED visit on 5/3/2025. Patient reports during that visit, he was evaluated for chest pain and SOB, which he attributes to taking too much Cialis, which he discontinued with resolution of his chest pain. He reports he has not followed up with cardiology yet. He reports he has SOB when sitting down or exertion, such as walking, that improves after he straightens his body. He reports he feels like he can't get an extra breath and is gasping for air when he sits down. He reports it is not accompanied with dizziness, lightheadedness, abdominal pain, or diaphoresis. He reports his SOB is increasing in frequency, he is having multiple episodes a day, and he last had SOB while in the car coming here. He reports concerns of anxiety. He states he works out 1-2 times a day and he drank a Gorilla Mind energy drink with no more than 200 mg caffeine today. He reports he doesn't drink energy drinks all the time since routinely working out but he used to. He reports he only takes supplements daily including methylene blue, which is new (started just prior to his symptoms initially starting), turkey tail for joints, and creatine. No other exacerbating or alleviating factors. Denies history of respiratory problems. Patient denies chest pain or other associated symptoms.      Per chart review, during his ED visit on 5/3/2025, he had unremarkable EKG, negative troponin, unremarkable CXR, and CTA chest notable for incidental note made of a tiny 2-3 mm noncalcified pulmonary nodule.     EKG 6/6/25 NSR NSSTT changes    6/19/25 CP and VALLADARES have improved since he stopped methylene blue.  BP not well controlled  Strong family Hx premature CAD  Not a smoker        Review of Systems    Constitutional: Negative for decreased appetite.   HENT:  Negative for ear discharge.    Eyes:  Negative for blurred vision.   Endocrine: Negative for polyphagia.   Skin:  Negative for nail changes.   Genitourinary:  Negative for bladder incontinence.   Neurological:  Negative for aphonia.   Psychiatric/Behavioral:  Negative for hallucinations.    Allergic/Immunologic: Negative for hives.          Objective     Physical Exam  Constitutional:       Appearance: He is well-developed.   HENT:      Head: Normocephalic and atraumatic.   Eyes:      Conjunctiva/sclera: Conjunctivae normal.      Pupils: Pupils are equal, round, and reactive to light.   Cardiovascular:      Rate and Rhythm: Normal rate.      Pulses: Intact distal pulses.      Heart sounds: Normal heart sounds.   Pulmonary:      Effort: Pulmonary effort is normal.      Breath sounds: Normal breath sounds.   Abdominal:      General: Bowel sounds are normal.      Palpations: Abdomen is soft.   Musculoskeletal:         General: Normal range of motion.      Cervical back: Normal range of motion and neck supple.   Skin:     General: Skin is warm and dry.   Neurological:      Mental Status: He is alert and oriented to person, place, and time.            Assessment and Plan     1. VALLADARES (dyspnea on exertion)    2. Chest pain    3. Chest pain, atypical    4. Essential hypertension        Plan:     Norvasc 5 qd for HTN  Stress echo for CP, VALLADARES, and abnormal EKG    Advance Care Planning     Date: 06/19/2025  Patient did not wish or was not able to name a surrogate decision maker or provide an Advance Care Plan.

## 2025-06-30 ENCOUNTER — HOSPITAL ENCOUNTER (OUTPATIENT)
Dept: CARDIOLOGY | Facility: HOSPITAL | Age: 45
Discharge: HOME | End: 2025-06-30
Attending: INTERNAL MEDICINE
Payer: COMMERCIAL

## 2025-06-30 DIAGNOSIS — R07.89 CHEST PAIN, ATYPICAL: ICD-10-CM

## 2025-06-30 DIAGNOSIS — R06.09 DOE (DYSPNEA ON EXERTION): ICD-10-CM

## 2025-06-30 DIAGNOSIS — R07.9 CHEST PAIN: ICD-10-CM

## 2025-06-30 DIAGNOSIS — I10 ESSENTIAL HYPERTENSION: ICD-10-CM

## 2025-06-30 LAB
ASCENDING AORTA: 2.5 CM
AV INDEX (PROSTH): 0.81
AV MEAN GRADIENT: 6 MMHG
AV PEAK GRADIENT: 10 MMHG
AV VALVE AREA BY VELOCITY RATIO: 2.6 CM²
AV VALVE AREA: 2.5 CM²
AV VELOCITY RATIO: 0.81
CV ECHO LV RWT: 0.4 CM
CV STRESS BASE HR: 68 BPM
DIASTOLIC BLOOD PRESSURE: 93 MMHG
DOP CALC AO PEAK VEL: 1.6 M/S
DOP CALC AO VTI: 35.2 CM
DOP CALC LVOT AREA: 3.1 CM2
DOP CALC LVOT DIAMETER: 2 CM
DOP CALC LVOT PEAK VEL: 1.3 M/S
DOP CALC LVOT STROKE VOLUME: 89.2 CM3
DOP CALC MV VTI: 37.2 CM
DOP CALCLVOT PEAK VEL VTI: 28.4 CM
E WAVE DECELERATION TIME: 204 MSEC
E/A RATIO: 1.36
E/E' RATIO: 8 M/S
ECHO LV POSTERIOR WALL: 1 CM (ref 0.6–1.1)
FRACTIONAL SHORTENING: 30 % (ref 28–44)
INTERVENTRICULAR SEPTUM: 1 CM (ref 0.6–1.1)
IVC DIAMETER: 2.34 CM
IVRT: 95 MSEC
LA MAJOR: 6.1 CM
LA MINOR: 5.6 CM
LA WIDTH: 4.1 CM
LEFT ATRIUM SIZE: 4.6 CM
LEFT ATRIUM VOLUME: 94 CM3
LEFT INTERNAL DIMENSION IN SYSTOLE: 3.5 CM (ref 2.1–4)
LEFT VENTRICLE DIASTOLIC VOLUME: 117 ML
LEFT VENTRICLE SYSTOLIC VOLUME: 52 ML
LEFT VENTRICULAR INTERNAL DIMENSION IN DIASTOLE: 5 CM (ref 3.5–6)
LEFT VENTRICULAR MASS: 182 G
LV LATERAL E/E' RATIO: 6.5 M/S
LV SEPTAL E/E' RATIO: 12.2 M/S
LVED V (TEICH): 116.74 ML
LVES V (TEICH): 52.38 ML
LVOT MG: 3.63 MMHG
LVOT MV: 0.89 CM/S
MV MEAN GRADIENT: 3 MMHG
MV PEAK A VEL: 0.81 M/S
MV PEAK E VEL: 1.1 M/S
MV PEAK GRADIENT: 6 MMHG
MV STENOSIS PRESSURE HALF TIME: 59.16 MS
MV VALVE AREA BY CONTINUITY EQUATION: 2.4 CM2
MV VALVE AREA P 1/2 METHOD: 3.72 CM2
OHS CV CPX 1 MINUTE RECOVERY HEART RATE: 139 BPM
OHS CV CPX 85 PERCENT MAX PREDICTED HEART RATE MALE: 147
OHS CV CPX ESTIMATED METS: 15
OHS CV CPX MAX PREDICTED HEART RATE: 173
OHS CV CPX PATIENT IS FEMALE: 0
OHS CV CPX PATIENT IS MALE: 1
OHS CV CPX PEAK DIASTOLIC BLOOD PRESSURE: 66 MMHG
OHS CV CPX PEAK HEAR RATE: 169 BPM
OHS CV CPX PEAK RATE PRESSURE PRODUCT: NORMAL
OHS CV CPX PEAK SYSTOLIC BLOOD PRESSURE: 245 MMHG
OHS CV CPX PERCENT MAX PREDICTED HEART RATE ACHIEVED: 98
OHS CV CPX RATE PRESSURE PRODUCT PRESENTING: NORMAL
OHS CV RV/LV RATIO: 0.74 CM
PISA TR MAX VEL: 2.4 M/S
PV PEAK GRADIENT: 6 MMHG
PV PEAK VELOCITY: 1.27 M/S
RA MAJOR: 6.16 CM
RA PRESSURE ESTIMATED: 3 MMHG
RA WIDTH: 4.2 CM
RIGHT VENTRICLE DIASTOLIC BASEL DIMENSION: 3.7 CM
RIGHT VENTRICULAR END-DIASTOLIC DIMENSION: 3.74 CM
RV TB RVSP: 5 MMHG
RV TISSUE DOPPLER FREE WALL SYSTOLIC VELOCITY 1 (APICAL 4 CHAMBER VIEW): 15.96 CM/S
SINUS: 3.1 CM
STJ: 2.2 CM
STRESS ECHO POST EXERCISE DUR MIN: 13 MINUTES
STRESS ECHO POST EXERCISE DUR SEC: 4 SECONDS
SYSTOLIC BLOOD PRESSURE: 152 MMHG
TDI LATERAL: 0.17 M/S
TDI SEPTAL: 0.09 M/S
TDI: 0.13 M/S
TR MAX PG: 22 MMHG
TRICUSPID ANNULAR PLANE SYSTOLIC EXCURSION: 2.3 CM
TV REST PULMONARY ARTERY PRESSURE: 26 MMHG

## 2025-06-30 PROCEDURE — 93325 DOPPLER ECHO COLOR FLOW MAPG: CPT

## 2025-07-25 ENCOUNTER — OFFICE VISIT (OUTPATIENT)
Dept: CARDIOLOGY | Facility: CLINIC | Age: 45
End: 2025-07-25
Payer: COMMERCIAL

## 2025-07-25 VITALS
BODY MASS INDEX: 30.79 KG/M2 | RESPIRATION RATE: 18 BRPM | DIASTOLIC BLOOD PRESSURE: 82 MMHG | HEIGHT: 66 IN | SYSTOLIC BLOOD PRESSURE: 154 MMHG | HEART RATE: 75 BPM | WEIGHT: 191.56 LBS | OXYGEN SATURATION: 97 %

## 2025-07-25 DIAGNOSIS — I10 ESSENTIAL HYPERTENSION: Primary | ICD-10-CM

## 2025-07-25 DIAGNOSIS — R07.89 CHEST PAIN, ATYPICAL: ICD-10-CM

## 2025-07-25 DIAGNOSIS — R06.09 DOE (DYSPNEA ON EXERTION): ICD-10-CM

## 2025-07-25 PROCEDURE — 99999 PR PBB SHADOW E&M-EST. PATIENT-LVL IV: CPT | Mod: PBBFAC,,, | Performed by: INTERNAL MEDICINE

## 2025-07-25 NOTE — PROGRESS NOTES
Subjective   Patient ID:  Aldo Valencia is a 45 y.o. male who presents for follow-up of No chief complaint on file.      HPI      HTN     Went to the ER 6/6/25  Aldo Valencia is a 45 y.o. male, with a PMHx of HTN, who presents to the ED with intermittent SOB since his last ED visit on 5/3/2025. Patient reports during that visit, he was evaluated for chest pain and SOB, which he attributes to taking too much Cialis, which he discontinued with resolution of his chest pain. He reports he has not followed up with cardiology yet. He reports he has SOB when sitting down or exertion, such as walking, that improves after he straightens his body. He reports he feels like he can't get an extra breath and is gasping for air when he sits down. He reports it is not accompanied with dizziness, lightheadedness, abdominal pain, or diaphoresis. He reports his SOB is increasing in frequency, he is having multiple episodes a day, and he last had SOB while in the car coming here. He reports concerns of anxiety. He states he works out 1-2 times a day and he drank a Gorilla Mind energy drink with no more than 200 mg caffeine today. He reports he doesn't drink energy drinks all the time since routinely working out but he used to. He reports he only takes supplements daily including methylene blue, which is new (started just prior to his symptoms initially starting), turkey tail for joints, and creatine. No other exacerbating or alleviating factors. Denies history of respiratory problems. Patient denies chest pain or other associated symptoms.      Per chart review, during his ED visit on 5/3/2025, he had unremarkable EKG, negative troponin, unremarkable CXR, and CTA chest notable for incidental note made of a tiny 2-3 mm noncalcified pulmonary nodule.      EKG 6/6/25 NSR NSSTT changes    Stress echo 6/30/25    Stress Protocol: The patient exercised for 13 minutes 4 seconds on a Shiva protocol, achieving a peak heart rate of 169 bpm, which  is 98% of the age predicted maximum heart rate. The patient experienced no angina during the test. Their exercise capacity was above average. The patient reported no symptoms during the stress test. The test was stopped because the patient experienced fatigue.    Left Ventricle: The left ventricle is normal in size. There is normal systolic function with a visually estimated ejection fraction of 55 - 60%.    Right Ventricle: The right ventricle is normal in size measuring 3.7 cm. Systolic function is normal.    Left Atrium: The left atrium is mildly dilated    Right Atrium: The right atrium is mildly dilated .    Pulmonary Artery: The estimated pulmonary artery systolic pressure is 26 mmHg.    IVC/SVC: Normal venous pressure at 3 mmHg.    Baseline ECG: The Baseline ECG reveals sinus rhythm.    Stress ECG: During stress, rare PVCs are noted.    ECG Conclusion: The ECG portion of the study is negative for ischemia.    Post-stress Echo: The left ventricle systolic function is normal.    Post-stress Conclusion: The study is negative with no echocardiographic evidence of stress induced ischemia.        6/19/25 CP and VALLADARES have improved since he stopped methylene blue.  BP not well controlled  Strong family Hx premature CAD  Not a smoker  Norvasc 5 qd for HTN  Stress echo for CP, VALLADARES, and abnormal EKG     7/25/25 Denies CP or SOB  BP still elevated here and by home readings 140-150 systolic    Review of Systems   Constitutional: Negative for decreased appetite.   HENT:  Negative for ear discharge.    Eyes:  Negative for blurred vision.   Endocrine: Negative for polyphagia.   Skin:  Negative for nail changes.   Genitourinary:  Negative for bladder incontinence.   Neurological:  Negative for aphonia.   Psychiatric/Behavioral:  Negative for hallucinations.    Allergic/Immunologic: Negative for hives.          Objective     Physical Exam  Constitutional:       Appearance: He is well-developed.   HENT:      Head: Normocephalic  and atraumatic.   Eyes:      Conjunctiva/sclera: Conjunctivae normal.      Pupils: Pupils are equal, round, and reactive to light.   Cardiovascular:      Rate and Rhythm: Normal rate.      Pulses: Intact distal pulses.      Heart sounds: Normal heart sounds.   Pulmonary:      Effort: Pulmonary effort is normal.      Breath sounds: Normal breath sounds.   Abdominal:      General: Bowel sounds are normal.      Palpations: Abdomen is soft.   Musculoskeletal:         General: Normal range of motion.      Cervical back: Normal range of motion and neck supple.   Skin:     General: Skin is warm and dry.   Neurological:      Mental Status: He is alert and oriented to person, place, and time.            Assessment and Plan     1. Essential hypertension    2. VALLADARES (dyspnea on exertion)    3. Chest pain, atypical        Plan:     Stress echo ok  BP still not controlled - discussed increasing norvasc or adding ACE-I or ARB - he would like to monitor and try lifestyle changes first before changing medication  Continue Rx for HTN  OV 3 months    Advance Care Planning     Date: 07/25/2025  Patient did not wish or was not able to name a surrogate decision maker or provide an Advance Care Plan.